# Patient Record
Sex: FEMALE | Race: WHITE
[De-identification: names, ages, dates, MRNs, and addresses within clinical notes are randomized per-mention and may not be internally consistent; named-entity substitution may affect disease eponyms.]

---

## 2018-06-21 ENCOUNTER — HOSPITAL ENCOUNTER (OUTPATIENT)
Dept: HOSPITAL 7 - FB.SDS | Age: 58
Discharge: HOME | End: 2018-06-21
Attending: SURGERY
Payer: COMMERCIAL

## 2018-06-21 DIAGNOSIS — Z83.71: ICD-10-CM

## 2018-06-21 DIAGNOSIS — Z80.0: ICD-10-CM

## 2018-06-21 DIAGNOSIS — E66.9: ICD-10-CM

## 2018-06-21 DIAGNOSIS — Z79.82: ICD-10-CM

## 2018-06-21 DIAGNOSIS — Z12.11: Primary | ICD-10-CM

## 2018-06-21 DIAGNOSIS — Z79.899: ICD-10-CM

## 2018-06-21 DIAGNOSIS — I10: ICD-10-CM

## 2018-06-21 DIAGNOSIS — F33.42: ICD-10-CM

## 2018-06-21 PROCEDURE — 45378 DIAGNOSTIC COLONOSCOPY: CPT

## 2018-06-21 NOTE — OR
DATE OF OPERATION:  06/21/2018

 

SURGEON:  Mahad Puente MD

 

PROCEDURE PERFORMED:  Colonoscopy.

 

PREOPERATIVE DIAGNOSIS:  Need for screening colonoscopy, colon cancer screening,

and family history of colon polyps and colon cancer.

 

POSTOPERATIVE DIAGNOSIS:  Normal exam.

 

INDICATIONS FOR PROCEDURE:  This is a 58-year-old white female who is referred

for screening colonoscopy.  She was offered and accepted same.  She has a family

history of colon polyps with her siblings and a history of colon cancer with her

father.

 

DESCRIPTION OF PROCEDURE:  After an excellent IV sedation was administered,

digital rectal exam was performed.  No marked abnormality was noted.  The

flexible colonoscope was inserted and advanced to the cecum without difficulty.

The prep was excellent.  The following findings were noted.  Ascending colon,

unremarkable.  Transverse colon, unremarkable.  Descending colon, unremarkable.

Sigmoid and rectum, unremarkable.  Colon was deflated.  The scope was removed.

The patient tolerated procedure well, and was taken to recovery room in good

condition.  Repeat scope in 10 years.

 

Job#: [id number]

DD: 06/21/2018 0943

DT: 06/21/2018 0958 AS/MODL

## 2018-06-21 NOTE — PCM.OPNOTE
- General Post-Op/Procedure Note


Date of Surgery/Procedure: 06/21/18


Operative Procedure(s): c scope


Findings: 





normal exam 


Pre Op Diagnosis: family hx of colon polyps and cancer


Post-Op Diagnosis: nl exam


Anesthesia Technique: MAC


Primary Surgeon: Mahad Puente


Anesthesia Provider: Robert Cabral


Pathology: 





none





Complications: None


Condition: Good


Free Text/Narrative:: 





see dictation #[id number]

## 2018-06-21 NOTE — PREOP
ADMISSION DATE:  06/21/2018

 

CHIEF COMPLAINT:  Colon cancer screening.

 

HISTORY OF PRESENT ILLNESS:  This 58-year-old white female referred for a

followup colonoscopy.  She has no complaints.  Does have a family history of

colon polyps with all her siblings having had polyps and father having colon

cancer.  Her last colonoscopy was 10 years ago.

 

MEDICATIONS:

1. Lexapro 10 mg daily.

2. Lisinopril/hydrochlorothiazide 10/12.5 daily.

3. Minocycline 100 mg by mouth daily.

4. Baby aspirin 81 mg daily.

5. Calcium carbonate.

6. Diprosone cream.

7. Mobic on a p.r.n. basis.

 

ALLERGIES:  She has no known drug allergies.

 

PAST MEDICAL HISTORY:  Significant for contact dermatitis, breast lump.

 

PAST SURGICAL HISTORY:  Significant for left knee arthroscopy, breast biopsy,

cystocele and rectocele repair, hysterectomy, wisdom teeth extraction, and tubal

ligation.

 

FAMILY HISTORY:  As mentioned, father had colon cancer at the age of 63.  There

is also a history of stroke, heart disease, and hypothyroidism.  Ovarian cancer

in the mother.

 

SOCIAL HISTORY:  The patient is , has 2 children.  Works as an LPN.  Does

not smoke.  Has an occasional drink.

 

REVIEW OF SYSTEMS:  Constitutional, HEENT, respiratory, cardiac,

gastrointestinal, genitourinary were all negative.

 

PHYSICAL EXAMINATION:  VITAL SIGNS:  Reviewed.  She is stable and afebrile.

HEENT:  Grossly within normal limits.  LUNGS:  Clear to auscultation.  HEART:

Had a regular rate and rhythm.  ABDOMEN:  Soft and nontender.

 

ASSESSMENT:

1. Screening for colon cancer.

2. Family history of colon cancer.

 

PLAN:  Colonoscopy.  Procedure and risks explained to the patient to include

bleeding, perforation, and infection.  The patient expresses understanding and

she asked us to proceed.

 

Job#: [id number]

DD: 06/21/2018 0823

DT: 06/21/2018 0948 AS/MODL